# Patient Record
Sex: MALE | Race: WHITE | Employment: OTHER | URBAN - METROPOLITAN AREA
[De-identification: names, ages, dates, MRNs, and addresses within clinical notes are randomized per-mention and may not be internally consistent; named-entity substitution may affect disease eponyms.]

---

## 2019-06-21 ENCOUNTER — HOSPITAL ENCOUNTER (OUTPATIENT)
Dept: PREADMISSION TESTING | Age: 69
Discharge: HOME | End: 2019-06-21
Attending: ORTHOPAEDIC SURGERY
Payer: MEDICARE

## 2019-06-21 DIAGNOSIS — M54.6 THORACIC SPINE PAIN: ICD-10-CM

## 2019-06-21 DIAGNOSIS — Z01.812 BLOOD TESTS PRIOR TO TREATMENT OR PROCEDURE: ICD-10-CM

## 2019-06-21 DIAGNOSIS — Z01.818 OTHER SPECIFIED PRE-OPERATIVE EXAMINATION: ICD-10-CM

## 2019-06-21 LAB
ALBUMIN SERPL-MCNC: 4 G/DL (ref 3.4–5)
ALBUMIN/GLOB SERPL: 1.3 {RATIO} (ref 0.8–1.7)
ALP SERPL-CCNC: 108 U/L (ref 45–117)
ALT SERPL-CCNC: 32 U/L (ref 16–61)
ANION GAP SERPL CALC-SCNC: 6 MMOL/L (ref 3–18)
APPEARANCE UR: CLEAR
APTT PPP: 28.5 SEC (ref 23–36.4)
AST SERPL-CCNC: 23 U/L (ref 15–37)
ATRIAL RATE: 57 BPM
BACTERIA SPEC CULT: NORMAL
BASOPHILS # BLD: 0.1 K/UL (ref 0–0.1)
BASOPHILS NFR BLD: 1 % (ref 0–2)
BILIRUB SERPL-MCNC: 1.6 MG/DL (ref 0.2–1)
BILIRUB UR QL: NEGATIVE
BUN SERPL-MCNC: 14 MG/DL (ref 7–18)
BUN/CREAT SERPL: 12 (ref 12–20)
CALCIUM SERPL-MCNC: 9.4 MG/DL (ref 8.5–10.1)
CALCULATED P AXIS, ECG09: 56 DEGREES
CALCULATED R AXIS, ECG10: 24 DEGREES
CALCULATED T AXIS, ECG11: 71 DEGREES
CHLORIDE SERPL-SCNC: 103 MMOL/L (ref 100–108)
CO2 SERPL-SCNC: 31 MMOL/L (ref 21–32)
COLOR UR: YELLOW
CREAT SERPL-MCNC: 1.19 MG/DL (ref 0.6–1.3)
DIAGNOSIS, 93000: NORMAL
DIFFERENTIAL METHOD BLD: ABNORMAL
EOSINOPHIL # BLD: 0.1 K/UL (ref 0–0.4)
EOSINOPHIL NFR BLD: 2 % (ref 0–5)
ERYTHROCYTE [DISTWIDTH] IN BLOOD BY AUTOMATED COUNT: 12.4 % (ref 11.6–14.5)
ERYTHROCYTE [SEDIMENTATION RATE] IN BLOOD: 2 MM/HR (ref 0–20)
GLOBULIN SER CALC-MCNC: 3.2 G/DL (ref 2–4)
GLUCOSE SERPL-MCNC: 92 MG/DL (ref 74–99)
GLUCOSE UR STRIP.AUTO-MCNC: NEGATIVE MG/DL
HBA1C MFR BLD: 5.6 % (ref 4.2–5.6)
HCT VFR BLD AUTO: 48.2 % (ref 36–48)
HGB BLD-MCNC: 16.7 G/DL (ref 13–16)
HGB UR QL STRIP: NEGATIVE
INR PPP: 0.9 (ref 0.8–1.2)
KETONES UR QL STRIP.AUTO: NEGATIVE MG/DL
LEUKOCYTE ESTERASE UR QL STRIP.AUTO: NEGATIVE
LYMPHOCYTES # BLD: 1.7 K/UL (ref 0.9–3.6)
LYMPHOCYTES NFR BLD: 25 % (ref 21–52)
MCH RBC QN AUTO: 33.5 PG (ref 24–34)
MCHC RBC AUTO-ENTMCNC: 34.6 G/DL (ref 31–37)
MCV RBC AUTO: 96.6 FL (ref 74–97)
MONOCYTES # BLD: 0.5 K/UL (ref 0.05–1.2)
MONOCYTES NFR BLD: 7 % (ref 3–10)
NEUTS SEG # BLD: 4.4 K/UL (ref 1.8–8)
NEUTS SEG NFR BLD: 65 % (ref 40–73)
NITRITE UR QL STRIP.AUTO: NEGATIVE
P-R INTERVAL, ECG05: 188 MS
PH UR STRIP: 7.5 [PH] (ref 5–8)
PLATELET # BLD AUTO: 224 K/UL (ref 135–420)
PMV BLD AUTO: 10.1 FL (ref 9.2–11.8)
POTASSIUM SERPL-SCNC: 4.8 MMOL/L (ref 3.5–5.5)
PROT SERPL-MCNC: 7.2 G/DL (ref 6.4–8.2)
PROT UR STRIP-MCNC: NEGATIVE MG/DL
PROTHROMBIN TIME: 11.8 SEC (ref 11.5–15.2)
Q-T INTERVAL, ECG07: 394 MS
QRS DURATION, ECG06: 86 MS
QTC CALCULATION (BEZET), ECG08: 383 MS
RBC # BLD AUTO: 4.99 M/UL (ref 4.7–5.5)
SERVICE CMNT-IMP: NORMAL
SODIUM SERPL-SCNC: 140 MMOL/L (ref 136–145)
SP GR UR REFRACTOMETRY: 1.01 (ref 1–1.03)
UROBILINOGEN UR QL STRIP.AUTO: 0.2 EU/DL (ref 0.2–1)
VENTRICULAR RATE, ECG03: 57 BPM
WBC # BLD AUTO: 6.7 K/UL (ref 4.6–13.2)

## 2019-06-21 PROCEDURE — 36415 COLL VENOUS BLD VENIPUNCTURE: CPT

## 2019-06-21 PROCEDURE — 80053 COMPREHEN METABOLIC PANEL: CPT

## 2019-06-21 PROCEDURE — 83036 HEMOGLOBIN GLYCOSYLATED A1C: CPT

## 2019-06-21 PROCEDURE — 87641 MR-STAPH DNA AMP PROBE: CPT

## 2019-06-21 PROCEDURE — 81003 URINALYSIS AUTO W/O SCOPE: CPT

## 2019-06-21 PROCEDURE — 85610 PROTHROMBIN TIME: CPT

## 2019-06-21 PROCEDURE — 85652 RBC SED RATE AUTOMATED: CPT

## 2019-06-21 PROCEDURE — 85025 COMPLETE CBC W/AUTO DIFF WBC: CPT

## 2019-06-21 PROCEDURE — 93005 ELECTROCARDIOGRAM TRACING: CPT

## 2019-06-21 PROCEDURE — 85730 THROMBOPLASTIN TIME PARTIAL: CPT

## 2019-07-01 ENCOUNTER — ANESTHESIA EVENT (OUTPATIENT)
Dept: SURGERY | Age: 69
DRG: 460 | End: 2019-07-01
Payer: MEDICARE

## 2019-07-01 NOTE — H&P
History and Physical    Patient: Casey Saleh MRN: [id number]  SSN: [id number]    YOB: 1950  Age: 77 y.o. Sex: male      Subjective:      Casey Saleh is a 77 y.o. male who presents with evolving weakness and discoordination in his lower extremities. He has classic lower extremity myelopathic signs with absence of findings in the upper extremities. 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 Just being monitored at present    Arthritis     fingers, hips, back     Past Surgical History:   Procedure Laterality Date    HX OTHER SURGICAL      Colonoscopies      No family history on file. Social History     Tobacco Use    Smoking status: Never Smoker    Smokeless tobacco: Never Used   Substance Use Topics    Alcohol use: Yes     Alcohol/week: 0.6 oz     Types: 1 Cans of beer per week     Comment: ocassional- a few per week      Prior to Admission medications    Medication Sig Start Date End Date Taking? Authorizing Provider   acetaminophen (TYLENOL EXTRA STRENGTH) 500 mg tablet Take 500 mg by mouth every six (6) hours as needed for Pain. Provider, Historical   methocarbamol (ROBAXIN) 500 mg tablet Take 1,000 mg by mouth nightly. Provider, Historical   vit B Cmplx 3-FA-Vit C-Biotin (NEPHRO JOSE RX) 1- mg-mg-mcg tablet Take 1 Tab by mouth daily. Provider, Historical   ascorbic acid, vitamin C, (VITAMIN C) 500 mg tablet Take 500 mg by mouth daily. Provider, Historical   Omega-3 Fatty Acids 60- mg cpDR Take  by mouth daily. Provider, Historical   nicotinic acid (NIACIN) 500 mg tablet Take 500 mg by mouth Daily (before breakfast). Provider, Historical   cyanocobalamin 1,000 mcg tablet Take 1,000 mcg by mouth daily.     Provider, Historical        Allergies   Allergen Reactions    [place] Swelling     Of mouth and where ever the nut touches       Review of Systems:  A comprehensive review of systems was negative except for that written in the History of Present Illness. Objective: There were no vitals filed for this visit. Physical Exam:    The patient is neurologically intact bilaterally from C5-T1 and L2-S2 and its respective myotomes and dermatomes. There is april myelopathic features on exam - with lower extremity hyperreflexia, bilateral babinski's, clonus and ataxic gait. Skin is supple and amenable for surgery. Assessment:     Thoracic myelopathy    Plan:     In summary, this is a patient with clinical and radiographic evidence of severe thoracic spinal cord stenosis. The patient has significant quality of life and functional limitations. I've recommended that the surgical option of a open surgical decompression and instrumented fusion of T11-12. Ive explained the risks and benefits of the operation to the patient at length, including but not limited to surgical site infection (both superficial and deep, potentially requiring reoperation and prolonged antibiotic therapy), neurological injury and dural tears with resultant CSF leakage (short term and long term). Rare medical complications of anesthesia such as MI, VTE, stroke, pneumonia, post-operative delirium, UTI, post-operative blindness and even death. The patient may sufficiently lose enough blood to warrant and blood transfusion. Long-term complications such as stiffness, adjacent joint arthrosis, non-union, hardware failure, recurrent symptoms and failure to resolve symptoms completely. Ive also explained to the patient the post-operative rehabilitation protocol and restrictions to the patient. The patient has understood these risks and has provided me with informed consent FOR T11-12 DECOMPRESSION AND INSTRUMENTED FUSION.        Signed By: Sonja Ren MD     July 1, 2019

## 2019-07-02 ENCOUNTER — HOSPITAL ENCOUNTER (INPATIENT)
Age: 69
LOS: 1 days | Discharge: HOME HEALTH | DRG: 460 | End: 2019-07-03
Attending: ORTHOPAEDIC SURGERY | Admitting: ORTHOPAEDIC SURGERY
Payer: MEDICARE

## 2019-07-02 ENCOUNTER — ANESTHESIA (OUTPATIENT)
Dept: SURGERY | Age: 69
DRG: 460 | End: 2019-07-02
Payer: MEDICARE

## 2019-07-02 ENCOUNTER — APPOINTMENT (OUTPATIENT)
Dept: GENERAL RADIOLOGY | Age: 69
DRG: 460 | End: 2019-07-02
Attending: ORTHOPAEDIC SURGERY
Payer: MEDICARE

## 2019-07-02 DIAGNOSIS — M47.14 THORACIC MYELOPATHY: Primary | ICD-10-CM

## 2019-07-02 LAB
ABO + RH BLD: NORMAL
BLOOD GROUP ANTIBODIES SERPL: NORMAL
SPECIMEN EXP DATE BLD: NORMAL

## 2019-07-02 PROCEDURE — 86900 BLOOD TYPING SEROLOGIC ABO: CPT

## 2019-07-02 PROCEDURE — 76210000017 HC OR PH I REC 1.5 TO 2 HR: Performed by: ORTHOPAEDIC SURGERY

## 2019-07-02 PROCEDURE — 77030020262 HC SOL INJ SOD CL 0.9% 100ML: Performed by: ORTHOPAEDIC SURGERY

## 2019-07-02 PROCEDURE — 77030013079 HC BLNKT BAIR HGGR 3M -A: Performed by: ANESTHESIOLOGY

## 2019-07-02 PROCEDURE — C1713 ANCHOR/SCREW BN/BN,TIS/BN: HCPCS | Performed by: ORTHOPAEDIC SURGERY

## 2019-07-02 PROCEDURE — 77030032490 HC SLV COMPR SCD KNE COVD -B: Performed by: ORTHOPAEDIC SURGERY

## 2019-07-02 PROCEDURE — 77030006643: Performed by: ANESTHESIOLOGY

## 2019-07-02 PROCEDURE — 97161 PT EVAL LOW COMPLEX 20 MIN: CPT

## 2019-07-02 PROCEDURE — 74011250636 HC RX REV CODE- 250/636: Performed by: ORTHOPAEDIC SURGERY

## 2019-07-02 PROCEDURE — 77030011256 HC DRSG MEPILEX <16IN NO BORD MOLN -A: Performed by: ORTHOPAEDIC SURGERY

## 2019-07-02 PROCEDURE — 74011250636 HC RX REV CODE- 250/636

## 2019-07-02 PROCEDURE — 77030020268 HC MISC GENERAL SUPPLY: Performed by: ORTHOPAEDIC SURGERY

## 2019-07-02 PROCEDURE — 77030037875 HC DRSG MEPILEX <16IN BORD MOLN -A

## 2019-07-02 PROCEDURE — 74011250637 HC RX REV CODE- 250/637: Performed by: ANESTHESIOLOGY

## 2019-07-02 PROCEDURE — 76060000039 HC ANESTHESIA 4 TO 4.5 HR: Performed by: ORTHOPAEDIC SURGERY

## 2019-07-02 PROCEDURE — 77030008477 HC STYL SATN SLP COVD -A: Performed by: ANESTHESIOLOGY

## 2019-07-02 PROCEDURE — 77030039267 HC ADH SKN EXOFIN S2SG -B: Performed by: ORTHOPAEDIC SURGERY

## 2019-07-02 PROCEDURE — 77030031139 HC SUT VCRL2 J&J -A: Performed by: ORTHOPAEDIC SURGERY

## 2019-07-02 PROCEDURE — 77030018836 HC SOL IRR NACL ICUM -A: Performed by: ORTHOPAEDIC SURGERY

## 2019-07-02 PROCEDURE — 65270000029 HC RM PRIVATE

## 2019-07-02 PROCEDURE — 74011000250 HC RX REV CODE- 250: Performed by: ORTHOPAEDIC SURGERY

## 2019-07-02 PROCEDURE — 77030003028 HC SUT VCRL J&J -A: Performed by: ORTHOPAEDIC SURGERY

## 2019-07-02 PROCEDURE — 36415 COLL VENOUS BLD VENIPUNCTURE: CPT

## 2019-07-02 PROCEDURE — 74011000258 HC RX REV CODE- 258: Performed by: ORTHOPAEDIC SURGERY

## 2019-07-02 PROCEDURE — 74011250637 HC RX REV CODE- 250/637: Performed by: ORTHOPAEDIC SURGERY

## 2019-07-02 PROCEDURE — 77030037875 HC DRSG MEPILEX <16IN BORD MOLN -A: Performed by: ORTHOPAEDIC SURGERY

## 2019-07-02 PROCEDURE — 76010000135 HC OR TIME 4 TO 4.5 HR: Performed by: ORTHOPAEDIC SURGERY

## 2019-07-02 PROCEDURE — C9290 INJ, BUPIVACAINE LIPOSOME: HCPCS | Performed by: ORTHOPAEDIC SURGERY

## 2019-07-02 PROCEDURE — 77030008683 HC TU ET CUF COVD -A: Performed by: ANESTHESIOLOGY

## 2019-07-02 PROCEDURE — 77030002916 HC SUT ETHLN J&J -A: Performed by: ORTHOPAEDIC SURGERY

## 2019-07-02 PROCEDURE — 77030002933 HC SUT MCRYL J&J -A: Performed by: ORTHOPAEDIC SURGERY

## 2019-07-02 PROCEDURE — 77030020782 HC GWN BAIR PAWS FLX 3M -B: Performed by: ORTHOPAEDIC SURGERY

## 2019-07-02 PROCEDURE — 74011250636 HC RX REV CODE- 250/636: Performed by: ANESTHESIOLOGY

## 2019-07-02 PROCEDURE — 77030004402 HC BUR NEUR STRY -C: Performed by: ORTHOPAEDIC SURGERY

## 2019-07-02 PROCEDURE — 77030008462 HC STPLR SKN PROX J&J -A: Performed by: ORTHOPAEDIC SURGERY

## 2019-07-02 PROCEDURE — 77030031140 HC SUT VCRL3 J&J -A: Performed by: ORTHOPAEDIC SURGERY

## 2019-07-02 PROCEDURE — 72100 X-RAY EXAM L-S SPINE 2/3 VWS: CPT

## 2019-07-02 PROCEDURE — 77030020010 HC FCPS BPLR DISP INLC -E: Performed by: ORTHOPAEDIC SURGERY

## 2019-07-02 PROCEDURE — 74011000250 HC RX REV CODE- 250

## 2019-07-02 PROCEDURE — 77030029397 HC SHTH SBS BPLR SEALR MEDT -F: Performed by: ORTHOPAEDIC SURGERY

## 2019-07-02 PROCEDURE — 0RG6071 FUSION OF THORACIC VERTEBRAL JOINT WITH AUTOLOGOUS TISSUE SUBSTITUTE, POSTERIOR APPROACH, POSTERIOR COLUMN, OPEN APPROACH: ICD-10-PCS | Performed by: ORTHOPAEDIC SURGERY

## 2019-07-02 DEVICE — SCREW 54840005545 MAS 5.5X45 CC
Type: IMPLANTABLE DEVICE | Site: SPINE THORACIC | Status: FUNCTIONAL
Brand: CD HORIZON® SPINAL SYSTEM

## 2019-07-02 DEVICE — DBM 006005 PROGENIX PLUS 5CC
Type: IMPLANTABLE DEVICE | Site: SPINE THORACIC | Status: FUNCTIONAL
Brand: PROGENIX® PUTTY AND PROGENIX® PLUS

## 2019-07-02 RX ORDER — CYCLOBENZAPRINE HCL 10 MG
5 TABLET ORAL 3 TIMES DAILY
Status: DISCONTINUED | OUTPATIENT
Start: 2019-07-02 | End: 2019-07-03 | Stop reason: HOSPADM

## 2019-07-02 RX ORDER — SODIUM CHLORIDE, SODIUM LACTATE, POTASSIUM CHLORIDE, CALCIUM CHLORIDE 600; 310; 30; 20 MG/100ML; MG/100ML; MG/100ML; MG/100ML
125 INJECTION, SOLUTION INTRAVENOUS CONTINUOUS
Status: DISCONTINUED | OUTPATIENT
Start: 2019-07-02 | End: 2019-07-02

## 2019-07-02 RX ORDER — ACETAMINOPHEN 500 MG
1000 TABLET ORAL ONCE
Status: COMPLETED | OUTPATIENT
Start: 2019-07-02 | End: 2019-07-02

## 2019-07-02 RX ORDER — PROPOFOL 10 MG/ML
INJECTION, EMULSION INTRAVENOUS
Status: DISCONTINUED | OUTPATIENT
Start: 2019-07-02 | End: 2019-07-02 | Stop reason: HOSPADM

## 2019-07-02 RX ORDER — FENTANYL CITRATE 50 UG/ML
INJECTION, SOLUTION INTRAMUSCULAR; INTRAVENOUS AS NEEDED
Status: DISCONTINUED | OUTPATIENT
Start: 2019-07-02 | End: 2019-07-02 | Stop reason: HOSPADM

## 2019-07-02 RX ORDER — FLUMAZENIL 0.1 MG/ML
0.2 INJECTION INTRAVENOUS
Status: DISCONTINUED | OUTPATIENT
Start: 2019-07-02 | End: 2019-07-02 | Stop reason: HOSPADM

## 2019-07-02 RX ORDER — KETAMINE HYDROCHLORIDE 10 MG/ML
INJECTION, SOLUTION INTRAMUSCULAR; INTRAVENOUS AS NEEDED
Status: DISCONTINUED | OUTPATIENT
Start: 2019-07-02 | End: 2019-07-02 | Stop reason: HOSPADM

## 2019-07-02 RX ORDER — PROPOFOL 10 MG/ML
INJECTION, EMULSION INTRAVENOUS AS NEEDED
Status: DISCONTINUED | OUTPATIENT
Start: 2019-07-02 | End: 2019-07-02 | Stop reason: HOSPADM

## 2019-07-02 RX ORDER — HYDROMORPHONE HYDROCHLORIDE 2 MG/ML
0.5 INJECTION, SOLUTION INTRAMUSCULAR; INTRAVENOUS; SUBCUTANEOUS
Status: DISCONTINUED | OUTPATIENT
Start: 2019-07-02 | End: 2019-07-02 | Stop reason: HOSPADM

## 2019-07-02 RX ORDER — SENNOSIDES 8.6 MG/1
1 TABLET ORAL DAILY
Status: DISCONTINUED | OUTPATIENT
Start: 2019-07-02 | End: 2019-07-03 | Stop reason: HOSPADM

## 2019-07-02 RX ORDER — HYDROMORPHONE HYDROCHLORIDE 2 MG/ML
INJECTION, SOLUTION INTRAMUSCULAR; INTRAVENOUS; SUBCUTANEOUS AS NEEDED
Status: DISCONTINUED | OUTPATIENT
Start: 2019-07-02 | End: 2019-07-02 | Stop reason: HOSPADM

## 2019-07-02 RX ORDER — SODIUM CHLORIDE, SODIUM LACTATE, POTASSIUM CHLORIDE, CALCIUM CHLORIDE 600; 310; 30; 20 MG/100ML; MG/100ML; MG/100ML; MG/100ML
125 INJECTION, SOLUTION INTRAVENOUS CONTINUOUS
Status: DISCONTINUED | OUTPATIENT
Start: 2019-07-02 | End: 2019-07-02 | Stop reason: HOSPADM

## 2019-07-02 RX ORDER — LIDOCAINE HYDROCHLORIDE 20 MG/ML
INJECTION, SOLUTION EPIDURAL; INFILTRATION; INTRACAUDAL; PERINEURAL AS NEEDED
Status: DISCONTINUED | OUTPATIENT
Start: 2019-07-02 | End: 2019-07-02 | Stop reason: HOSPADM

## 2019-07-02 RX ORDER — ONDANSETRON 2 MG/ML
INJECTION INTRAMUSCULAR; INTRAVENOUS AS NEEDED
Status: DISCONTINUED | OUTPATIENT
Start: 2019-07-02 | End: 2019-07-02 | Stop reason: HOSPADM

## 2019-07-02 RX ORDER — SODIUM CHLORIDE 0.9 % (FLUSH) 0.9 %
5-40 SYRINGE (ML) INJECTION AS NEEDED
Status: DISCONTINUED | OUTPATIENT
Start: 2019-07-02 | End: 2019-07-03 | Stop reason: HOSPADM

## 2019-07-02 RX ORDER — OXYCODONE AND ACETAMINOPHEN 5; 325 MG/1; MG/1
1 TABLET ORAL
Status: DISCONTINUED | OUTPATIENT
Start: 2019-07-02 | End: 2019-07-03 | Stop reason: HOSPADM

## 2019-07-02 RX ORDER — EPHEDRINE SULFATE/0.9% NACL/PF 25 MG/5 ML
SYRINGE (ML) INTRAVENOUS AS NEEDED
Status: DISCONTINUED | OUTPATIENT
Start: 2019-07-02 | End: 2019-07-02 | Stop reason: HOSPADM

## 2019-07-02 RX ORDER — PREGABALIN 50 MG/1
50 CAPSULE ORAL ONCE
Status: COMPLETED | OUTPATIENT
Start: 2019-07-02 | End: 2019-07-02

## 2019-07-02 RX ORDER — SODIUM CHLORIDE 0.9 % (FLUSH) 0.9 %
5-40 SYRINGE (ML) INJECTION AS NEEDED
Status: DISCONTINUED | OUTPATIENT
Start: 2019-07-02 | End: 2019-07-02 | Stop reason: HOSPADM

## 2019-07-02 RX ORDER — CEFAZOLIN SODIUM/WATER 2 G/20 ML
2 SYRINGE (ML) INTRAVENOUS ONCE
Status: COMPLETED | OUTPATIENT
Start: 2019-07-02 | End: 2019-07-02

## 2019-07-02 RX ORDER — HYDROMORPHONE HYDROCHLORIDE 1 MG/ML
0.5 INJECTION, SOLUTION INTRAMUSCULAR; INTRAVENOUS; SUBCUTANEOUS
Status: DISCONTINUED | OUTPATIENT
Start: 2019-07-02 | End: 2019-07-03 | Stop reason: HOSPADM

## 2019-07-02 RX ORDER — ENOXAPARIN SODIUM 100 MG/ML
40 INJECTION SUBCUTANEOUS EVERY 24 HOURS
Status: DISCONTINUED | OUTPATIENT
Start: 2019-07-03 | End: 2019-07-03 | Stop reason: HOSPADM

## 2019-07-02 RX ORDER — DOCUSATE SODIUM 100 MG/1
100 CAPSULE, LIQUID FILLED ORAL 2 TIMES DAILY
Status: DISCONTINUED | OUTPATIENT
Start: 2019-07-02 | End: 2019-07-03 | Stop reason: HOSPADM

## 2019-07-02 RX ORDER — NALOXONE HYDROCHLORIDE 0.4 MG/ML
0.4 INJECTION, SOLUTION INTRAMUSCULAR; INTRAVENOUS; SUBCUTANEOUS AS NEEDED
Status: DISCONTINUED | OUTPATIENT
Start: 2019-07-02 | End: 2019-07-02 | Stop reason: HOSPADM

## 2019-07-02 RX ORDER — MIDAZOLAM HYDROCHLORIDE 1 MG/ML
INJECTION, SOLUTION INTRAMUSCULAR; INTRAVENOUS AS NEEDED
Status: DISCONTINUED | OUTPATIENT
Start: 2019-07-02 | End: 2019-07-02 | Stop reason: HOSPADM

## 2019-07-02 RX ORDER — FENTANYL CITRATE 50 UG/ML
50 INJECTION, SOLUTION INTRAMUSCULAR; INTRAVENOUS AS NEEDED
Status: DISCONTINUED | OUTPATIENT
Start: 2019-07-02 | End: 2019-07-02 | Stop reason: HOSPADM

## 2019-07-02 RX ORDER — HYDROCODONE BITARTRATE AND ACETAMINOPHEN 5; 325 MG/1; MG/1
1 TABLET ORAL AS NEEDED
Status: DISCONTINUED | OUTPATIENT
Start: 2019-07-02 | End: 2019-07-02 | Stop reason: HOSPADM

## 2019-07-02 RX ORDER — ROCURONIUM BROMIDE 10 MG/ML
INJECTION, SOLUTION INTRAVENOUS AS NEEDED
Status: DISCONTINUED | OUTPATIENT
Start: 2019-07-02 | End: 2019-07-02 | Stop reason: HOSPADM

## 2019-07-02 RX ORDER — SODIUM CHLORIDE 0.9 % (FLUSH) 0.9 %
5-40 SYRINGE (ML) INJECTION EVERY 8 HOURS
Status: DISCONTINUED | OUTPATIENT
Start: 2019-07-02 | End: 2019-07-03 | Stop reason: HOSPADM

## 2019-07-02 RX ORDER — DEXAMETHASONE SODIUM PHOSPHATE 4 MG/ML
INJECTION, SOLUTION INTRA-ARTICULAR; INTRALESIONAL; INTRAMUSCULAR; INTRAVENOUS; SOFT TISSUE AS NEEDED
Status: DISCONTINUED | OUTPATIENT
Start: 2019-07-02 | End: 2019-07-02 | Stop reason: HOSPADM

## 2019-07-02 RX ORDER — CEFAZOLIN SODIUM/WATER 2 G/20 ML
2 SYRINGE (ML) INTRAVENOUS EVERY 8 HOURS
Status: COMPLETED | OUTPATIENT
Start: 2019-07-02 | End: 2019-07-03

## 2019-07-02 RX ORDER — VANCOMYCIN HYDROCHLORIDE 1 G/20ML
INJECTION, POWDER, LYOPHILIZED, FOR SOLUTION INTRAVENOUS AS NEEDED
Status: DISCONTINUED | OUTPATIENT
Start: 2019-07-02 | End: 2019-07-02 | Stop reason: HOSPADM

## 2019-07-02 RX ORDER — CELECOXIB 100 MG/1
200 CAPSULE ORAL 2 TIMES DAILY
Status: DISCONTINUED | OUTPATIENT
Start: 2019-07-02 | End: 2019-07-03 | Stop reason: HOSPADM

## 2019-07-02 RX ORDER — GABAPENTIN 100 MG/1
100 CAPSULE ORAL 3 TIMES DAILY
Status: DISCONTINUED | OUTPATIENT
Start: 2019-07-02 | End: 2019-07-03 | Stop reason: HOSPADM

## 2019-07-02 RX ORDER — NEOSTIGMINE METHYLSULFATE 5 MG/5 ML
SYRINGE (ML) INTRAVENOUS AS NEEDED
Status: DISCONTINUED | OUTPATIENT
Start: 2019-07-02 | End: 2019-07-02 | Stop reason: HOSPADM

## 2019-07-02 RX ORDER — GLYCOPYRROLATE 0.2 MG/ML
INJECTION INTRAMUSCULAR; INTRAVENOUS AS NEEDED
Status: DISCONTINUED | OUTPATIENT
Start: 2019-07-02 | End: 2019-07-02 | Stop reason: HOSPADM

## 2019-07-02 RX ORDER — SODIUM CHLORIDE 0.9 % (FLUSH) 0.9 %
5-40 SYRINGE (ML) INJECTION EVERY 8 HOURS
Status: DISCONTINUED | OUTPATIENT
Start: 2019-07-02 | End: 2019-07-02 | Stop reason: HOSPADM

## 2019-07-02 RX ORDER — ACETAMINOPHEN 325 MG/1
650 TABLET ORAL
Status: DISCONTINUED | OUTPATIENT
Start: 2019-07-02 | End: 2019-07-03 | Stop reason: HOSPADM

## 2019-07-02 RX ADMIN — Medication 5 MG: at 09:55

## 2019-07-02 RX ADMIN — KETAMINE HYDROCHLORIDE 10 MG: 10 INJECTION, SOLUTION INTRAMUSCULAR; INTRAVENOUS at 09:02

## 2019-07-02 RX ADMIN — CYCLOBENZAPRINE HYDROCHLORIDE 5 MG: 10 TABLET, FILM COATED ORAL at 15:51

## 2019-07-02 RX ADMIN — LIDOCAINE HYDROCHLORIDE 60 MG: 20 INJECTION, SOLUTION EPIDURAL; INFILTRATION; INTRACAUDAL; PERINEURAL at 07:42

## 2019-07-02 RX ADMIN — HYDROMORPHONE HYDROCHLORIDE 0.5 MG: 2 INJECTION, SOLUTION INTRAMUSCULAR; INTRAVENOUS; SUBCUTANEOUS at 10:20

## 2019-07-02 RX ADMIN — KETAMINE HYDROCHLORIDE 10 MG: 10 INJECTION, SOLUTION INTRAMUSCULAR; INTRAVENOUS at 08:12

## 2019-07-02 RX ADMIN — Medication 2 G: at 08:00

## 2019-07-02 RX ADMIN — HYDROMORPHONE HYDROCHLORIDE 0.5 MG: 2 INJECTION, SOLUTION INTRAMUSCULAR; INTRAVENOUS; SUBCUTANEOUS at 08:16

## 2019-07-02 RX ADMIN — ACETAMINOPHEN 650 MG: 325 TABLET ORAL at 18:11

## 2019-07-02 RX ADMIN — LIDOCAINE HYDROCHLORIDE 20 MG: 20 INJECTION, SOLUTION EPIDURAL; INFILTRATION; INTRACAUDAL; PERINEURAL at 09:15

## 2019-07-02 RX ADMIN — Medication 5 MG: at 09:33

## 2019-07-02 RX ADMIN — PREGABALIN 50 MG: 50 CAPSULE ORAL at 06:10

## 2019-07-02 RX ADMIN — PROPOFOL 20 MG: 10 INJECTION, EMULSION INTRAVENOUS at 09:43

## 2019-07-02 RX ADMIN — PROPOFOL 30 MG: 10 INJECTION, EMULSION INTRAVENOUS at 09:16

## 2019-07-02 RX ADMIN — PROPOFOL 140 MG: 10 INJECTION, EMULSION INTRAVENOUS at 07:42

## 2019-07-02 RX ADMIN — ENOXAPARIN SODIUM 40 MG: 40 INJECTION SUBCUTANEOUS at 23:17

## 2019-07-02 RX ADMIN — Medication 10 MG: at 08:47

## 2019-07-02 RX ADMIN — KETAMINE HYDROCHLORIDE 10 MG: 10 INJECTION, SOLUTION INTRAMUSCULAR; INTRAVENOUS at 07:39

## 2019-07-02 RX ADMIN — GLYCOPYRROLATE 0.2 MG: 0.2 INJECTION INTRAMUSCULAR; INTRAVENOUS at 11:24

## 2019-07-02 RX ADMIN — DEXAMETHASONE SODIUM PHOSPHATE 10 MG: 4 INJECTION, SOLUTION INTRA-ARTICULAR; INTRALESIONAL; INTRAMUSCULAR; INTRAVENOUS; SOFT TISSUE at 07:51

## 2019-07-02 RX ADMIN — DOCUSATE SODIUM 100 MG: 100 CAPSULE, LIQUID FILLED ORAL at 21:22

## 2019-07-02 RX ADMIN — HYDROMORPHONE HYDROCHLORIDE 0.5 MG: 2 INJECTION, SOLUTION INTRAMUSCULAR; INTRAVENOUS; SUBCUTANEOUS at 08:26

## 2019-07-02 RX ADMIN — OXYCODONE HYDROCHLORIDE AND ACETAMINOPHEN 1 TABLET: 5; 325 TABLET ORAL at 19:40

## 2019-07-02 RX ADMIN — LIDOCAINE HYDROCHLORIDE 40 MG: 20 INJECTION, SOLUTION EPIDURAL; INFILTRATION; INTRACAUDAL; PERINEURAL at 08:00

## 2019-07-02 RX ADMIN — SODIUM CHLORIDE, SODIUM LACTATE, POTASSIUM CHLORIDE, AND CALCIUM CHLORIDE: 600; 310; 30; 20 INJECTION, SOLUTION INTRAVENOUS at 08:27

## 2019-07-02 RX ADMIN — CYCLOBENZAPRINE HYDROCHLORIDE 5 MG: 10 TABLET, FILM COATED ORAL at 21:22

## 2019-07-02 RX ADMIN — Medication 5 MG: at 10:13

## 2019-07-02 RX ADMIN — ROCURONIUM BROMIDE 35 MG: 10 INJECTION, SOLUTION INTRAVENOUS at 07:43

## 2019-07-02 RX ADMIN — ACETAMINOPHEN 1000 MG: 500 TABLET ORAL at 06:10

## 2019-07-02 RX ADMIN — GABAPENTIN 100 MG: 100 CAPSULE ORAL at 15:50

## 2019-07-02 RX ADMIN — KETAMINE HYDROCHLORIDE 10 MG: 10 INJECTION, SOLUTION INTRAMUSCULAR; INTRAVENOUS at 07:54

## 2019-07-02 RX ADMIN — SODIUM CHLORIDE, SODIUM LACTATE, POTASSIUM CHLORIDE, AND CALCIUM CHLORIDE 125 ML/HR: 600; 310; 30; 20 INJECTION, SOLUTION INTRAVENOUS at 06:04

## 2019-07-02 RX ADMIN — Medication 5 MG: at 09:28

## 2019-07-02 RX ADMIN — Medication 10 MG: at 10:31

## 2019-07-02 RX ADMIN — MIDAZOLAM HYDROCHLORIDE 2 MG: 1 INJECTION, SOLUTION INTRAMUSCULAR; INTRAVENOUS at 07:37

## 2019-07-02 RX ADMIN — DOCUSATE SODIUM 100 MG: 100 CAPSULE, LIQUID FILLED ORAL at 15:51

## 2019-07-02 RX ADMIN — GABAPENTIN 100 MG: 100 CAPSULE ORAL at 21:23

## 2019-07-02 RX ADMIN — LIDOCAINE HYDROCHLORIDE 40 MG: 20 INJECTION, SOLUTION EPIDURAL; INFILTRATION; INTRACAUDAL; PERINEURAL at 08:30

## 2019-07-02 RX ADMIN — SODIUM CHLORIDE, SODIUM LACTATE, POTASSIUM CHLORIDE, AND CALCIUM CHLORIDE 125 ML: 600; 310; 30; 20 INJECTION, SOLUTION INTRAVENOUS at 12:12

## 2019-07-02 RX ADMIN — PROPOFOL 20 MG: 10 INJECTION, EMULSION INTRAVENOUS at 10:24

## 2019-07-02 RX ADMIN — Medication 2 G: at 15:50

## 2019-07-02 RX ADMIN — HYDROMORPHONE HYDROCHLORIDE 0.5 MG: 2 INJECTION, SOLUTION INTRAMUSCULAR; INTRAVENOUS; SUBCUTANEOUS at 09:38

## 2019-07-02 RX ADMIN — CELECOXIB 200 MG: 100 CAPSULE ORAL at 21:22

## 2019-07-02 RX ADMIN — FENTANYL CITRATE 100 MCG: 50 INJECTION, SOLUTION INTRAMUSCULAR; INTRAVENOUS at 07:36

## 2019-07-02 RX ADMIN — Medication 2 MG: at 11:24

## 2019-07-02 RX ADMIN — PROPOFOL 20 MG: 10 INJECTION, EMULSION INTRAVENOUS at 09:33

## 2019-07-02 RX ADMIN — SODIUM CHLORIDE, SODIUM LACTATE, POTASSIUM CHLORIDE, AND CALCIUM CHLORIDE: 600; 310; 30; 20 INJECTION, SOLUTION INTRAVENOUS at 07:40

## 2019-07-02 RX ADMIN — ONDANSETRON 4 MG: 2 INJECTION INTRAMUSCULAR; INTRAVENOUS at 09:33

## 2019-07-02 RX ADMIN — Medication 2 G: at 23:17

## 2019-07-02 RX ADMIN — SENNOSIDES 8.6 MG: 8.6 TABLET, FILM COATED ORAL at 15:50

## 2019-07-02 RX ADMIN — KETAMINE HYDROCHLORIDE 10 MG: 10 INJECTION, SOLUTION INTRAMUSCULAR; INTRAVENOUS at 08:39

## 2019-07-02 RX ADMIN — Medication 5 MG: at 07:45

## 2019-07-02 RX ADMIN — PROPOFOL 50 MCG/KG/MIN: 10 INJECTION, EMULSION INTRAVENOUS at 08:13

## 2019-07-02 RX ADMIN — ONDANSETRON 4 MG: 2 INJECTION INTRAMUSCULAR; INTRAVENOUS at 11:18

## 2019-07-02 NOTE — PERIOP NOTES
Verbal hand off at the bedside with Dong Mack provided opportunity for questions. Dual skin assessment and family members were updated.

## 2019-07-02 NOTE — ROUTINE PROCESS
[facility] TRANSFER - IN REPORT: 
 
Verbal report received from Rosa M Dasilva RN(name) on [facility] being received from [employer]) for routine post - op Report consisted of patients Situation, Background, Assessment and  
Recommendations(SBAR). Information from the following report(s) SBAR, Kardex, Intake/Output, MAR and Recent Results was reviewed with the receiving nurse. Opportunity for questions and clarification was provided. Assessment completed upon patients arrival to unit and care assumed.

## 2019-07-02 NOTE — BRIEF OP NOTE
BRIEF OPERATIVE NOTE    Date of Procedure: 7/2/2019   Preoperative Diagnosis: DISEASE OF SPINAL CORD, UNSPECIFIED  Postoperative Diagnosis: DISEASE OF SPINAL CORD, UNSPECIFIED    Procedure(s):  SPINE THORACIC LEVEL 11 THROUGH THORACIC LEVEL 12 DECOMPRESSION AND FUSION WITH NEUROMONITORING WITH C-ARM, \"SPEC POP\"  Surgeon(s) and Role:     Trudy Kerns MD - Primary         Surgical Assistant: Neil Corbett    Surgical Staff:  Circ-1: Angelia Au RN  Circ-Relief: Davi Cisneros RN  Radiology Technician: Harman Arellano  Scrub Tech-1: Oliva Huddleston  Surg Asst-1: Neil Corbett    Event Time In Time Out   Incision Start 4515    Incision Close       END 1133    Anesthesia: General   Estimated Blood Loss: 200cc  Specimens: * No specimens in log *   Findings: Stenosis S78-98   Complications: NIL    Implants:   Implant Name Type Inv.  Item Serial No.  Lot No. LRB No. Used Action   SCR SPNE MULTAXL MAS 5.5X45MM -- SOLERA - [id number]  SCR SPNE MULTAXL MAS 5.5X45MM -- SOLERA  MEDTRONIC SOFAMOR DANEK 0 N/A 2 Implanted   SCR SPNE MULTAXL MAS 5.5X40MM -- SOLERA - [id number]  SCR SPNE MULTAXL MAS 5.5X40MM -- SOLERA  MEDTRONIC SOFAMOR DANEK 0 N/A 2 Implanted   GRAFT BNE PROGENIX PLUS 5ML --  - [id number]  GRAFT BNE PROGENIX PLUS 5ML --   [facility] TECH [id number] N/A 1 Implanted   SCR SET SOLARA 30 4.75MM TI --  - [id number]  SCR SET SOLARA 30 4.75MM TI --   MEDTRONIC SOFAMOR DANEK 0 N/A 4 Implanted   RADHA SPNE CRV COCR 4.56X70LD -- SOLERA - [id number]  RADHA SPNE CRV COCR [id number] -- SOLERA  MEDTRONIC SOFAMOR DANEK 0 N/A 2 Implanted

## 2019-07-02 NOTE — ANESTHESIA PREPROCEDURE EVALUATION
Relevant Problems   No relevant active problems       Anesthetic History        Pertinent negatives: No PONV       Review of Systems / Medical History  Patient summary reviewed, nursing notes reviewed and pertinent labs reviewed    Pulmonary              Pertinent negatives: No asthma     Neuro/Psych           Pertinent negatives: No seizures  Comments: Pt with myelopathy involving bilateral lower extremities, worse on left side and left foot. Cardiovascular                Pertinent negatives: No hypertension, past MI and angina  Exercise tolerance: >4 METS     GI/Hepatic/Renal             Pertinent negatives: No GERD, liver disease and renal disease   Endo/Other        Arthritis  Pertinent negatives: No diabetes and obesity   Other Findings              Physical Exam    Airway  Mallampati: II  TM Distance: 4 - 6 cm  Neck ROM: normal range of motion   Mouth opening: Normal     Cardiovascular    Rhythm: regular  Rate: normal         Dental  No notable dental hx       Pulmonary  Breath sounds clear to auscultation               Abdominal  GI exam deferred       Other Findings            Anesthetic Plan    ASA: 1  Anesthesia type: general            Anesthetic plan and risks discussed with: Patient      Plan GETA. Pt understands risks up including but not limited to MI, CVA and upto and including death under anesthesia. Pt informed of rare and unpredictable risk of POVL that can occur in prolonged back surgery in the prone position. Pt accepts all risks and agrees to proceed.

## 2019-07-02 NOTE — PROGRESS NOTES
Problem: Mobility Impaired (Adult and Pediatric)  Goal: *Acute Goals and Plan of Care (Insert Text)  Description  Physical Therapy Goals   Initiated 7/2/2019 and to be accomplished within 3-5 day(s)  1. Patient will move from supine <> sit with S in prep for out of bed activity and change of position. 2.  Patient will perform sit<> stand with S with LRAD in prep for transfers/ambulation. 3.  Patient will transfer from bed <> chair with S with LRAD for time up in chair for completion of ADL activity. 4.  Patient will ambulate 150 feet with LRAD/S for improved functional mobility/safe discharge. 5.  Patient will ascend/descend 3-5 stairs with handrail with minimal assistance/contact guard assist for home re-entry as needed. Outcome: Progressing Towards Goal   PHYSICAL THERAPY EVALUATION    Patient: Catracho Francisco (5 y.o. male)  Date: 7/2/2019  Primary Diagnosis: Thoracic myelopathy [M47.14]  Procedure(s) (LRB):  SPINE THORACIC LEVEL 11 THROUGH THORACIC LEVEL 12 DECOMPRESSION AND FUSION WITH NEUROMONITORING WITH C-ARM, \"SPEC POP\" (N/A) Day of Surgery   Precautions:Back, Fall, Spinal    ASSESSMENT :  Based on the objective data described below, the patient presents with decrease independence w/ bed mobility, transfers, gait, and step negotiation. Pt seen in supine prior to session w/ supplemental O2, IV, BP, pulse ox, and B/L SCDs donned. Pt reported 1/10 pain at this time in lower back. Pt has no c/o lightheadedness, dizziness or nausea. Pt's vitals assessed WNLs. Pt educated on all spinal precautions and how to properly log roll for supine<>sit. Pt able to ambulate a total of 100 ft w/ RW/GB and demonstrates a step to, antalgic gait, decrease carolina, decrease stride length, and decrease step clearance. Pt transferred back to room where pt was left in supine after session, call bell and tray in reach, B/L SCDs donned, vitals assessed WNLs, nurse notified after session.       Patient will benefit from skilled intervention to address the above impairments. Patient?s rehabilitation potential is considered to be Good  Factors which may influence rehabilitation potential include:   ? None noted  ? Mental ability/status  ? Medical condition  ? Home/family situation and support systems  ? Safety awareness  ? Pain tolerance/management  ? Other:      PLAN :  Recommendations and Planned Interventions:  ?           Bed Mobility Training             ? Neuromuscular Re-Education  ? Transfer Training                   ? Orthotic/Prosthetic Training  ? Gait Training                          ? Modalities  ? Therapeutic Exercises          ? Edema Management/Control  ? Therapeutic Activities            ? Patient and Family Training/Education  ? Other (comment):    Frequency/Duration: Patient will be followed by physical therapy twice daily to address goals. Discharge Recommendations: Home Health  Further Equipment Recommendations for Discharge: rolling walker     SUBJECTIVE:   Patient stated ? It's fun to walk.?    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  Just being monitored at present    Arthritis     fingers, hips, back     Past Surgical History:   Procedure Laterality Date    HX OTHER SURGICAL      Colonoscopies     Barriers to Learning/Limitations: yes;  physical  Compensate with: Verbal Cues and Tactile Cues  Prior Level of Function/Home Situation:   Home Situation  Home Environment: Private residence  # Steps to Enter: 4  Rails to Enter: Yes  Hand Rails : Right  One/Two Story Residence: Two story  # of Interior Steps: 15  Interior Rails: Right  Lift Chair Available: No  Living Alone: No  Support Systems: Spouse/Significant Other/Partner  Patient Expects to be Discharged to[de-identified] Private residence  Current DME Used/Available at Home: None  Critical Behavior:  Neurologic State: Alert  Orientation Level: Oriented X4  Cognition: Appropriate decision making  Psychosocial  Patient Behaviors: Calm; Cooperative  Family  Behaviors: Calm; Cooperative;Supportive  Purposeful Interaction: Yes  Pt Identified Daily Priority: Clinical issues (comment)  Caritas Process: Nurture loving kindness  Caring Interventions: Reassure  Reassure: Therapeutic listening  Skin Condition/Temp: Warm  Family  Behaviors: Calm; Cooperative;Supportive  Skin Integrity: Incision (comment)  Skin Integumentary  Skin Color: Appropriate for ethnicity  Skin Condition/Temp: Warm  Skin Integrity: Incision (comment)  Turgor: Non-tenting  Hair Growth: Present  Varicosities: Absent  Strength:    Strength: Generally decreased, functional  Tone & Sensation:   Sensation: Intact  Range Of Motion:  AROM: Generally decreased, functional  Functional Mobility:  Bed Mobility:  Rolling: Stand-by assistance  Supine to Sit: Stand-by assistance  Sit to Supine: Stand-by assistance  Scooting: Supervision  Transfers:  Sit to Stand: Contact guard assistance  Stand to Sit: Contact guard assistance  Balance:   Sitting: Intact  Standing: Intact; With support  Ambulation/Gait Training:  Distance (ft): 100 Feet (ft)  Assistive Device: Gait belt;Walker, rolling  Ambulation - Level of Assistance: Contact guard assistance  Gait Description (WDL): Exceptions to WDL  Gait Abnormalities: Decreased step clearance  Speed/Deedee: Slow  Step Length: Left shortened;Right shortened  Pain:  Pain Scale 1: Numeric (0 - 10)  Pain Intensity 1: 4  Pain Location 1: Back  Pain Orientation 1: Lower  Pain Description 1: Aching  Pain Intervention(s) 1: Declines; Rest  Activity Tolerance:   Fair+  Please refer to the flowsheet for vital signs taken during this treatment. After treatment:   ?         Patient left in no apparent distress sitting up in chair  ? Patient left in no apparent distress in bed  ?          Call bell left within reach  ? Nursing notified  ? Caregiver present(spouse)  ? Bed alarm activated    COMMUNICATION/EDUCATION:   ?         Fall prevention education was provided and the patient/caregiver indicated understanding. ? Patient/family have participated as able in goal setting and plan of care. ?         Patient/family agree to work toward stated goals and plan of care. ?         Patient understands intent and goals of therapy, but is neutral about his/her participation. ? Patient is unable to participate in goal setting and plan of care.     Thank you for this referral.  Hector Donald, PT   Time Calculation: 12 mins   Eval Complexity: History: MEDIUM  Complexity : 1-2 comorbidities / personal factors will impact the outcome/ POC Exam:LOW Complexity : 1-2 Standardized tests and measures addressing body structure, function, activity limitation and / or participation in recreation  Presentation: LOW Complexity : Stable, uncomplicated  Clinical Decision Making:Low Complexity ambulate >30ft  Overall Complexity:LOW

## 2019-07-02 NOTE — OP NOTES
FULL OPERATIVE NOTE    Date of Procedure: 7/2/2019   Preoperative Diagnosis: DISEASE OF SPINAL CORD, UNSPECIFIED  Postoperative Diagnosis: DISEASE OF SPINAL CORD, UNSPECIFIED    Procedure(s):  SPINE THORACIC LEVEL 11 THROUGH THORACIC LEVEL 12 DECOMPRESSION AND FUSION WITH NEUROMONITORING WITH C-ARM, \"SPEC POP\"    1. T11-12 posterior decompression with laminectomy  2. T11-12 posterior instrumentation, pedicle screws, bilateral  3. T11-12 arthrodesis, posterolateral  4. Neuromonitoring  5. Application of local autograft    Surgeon(s) and Role:     Amanda Bolton MD - Primary         Surgical Assistant: Sangita Cralwey    Surgical Staff:  Circ-1: Naomi Traore RN  Circ-Relief: Dene Hammans, RN  Radiology Technician: Pete Patricia  Scrub Tech-1: Reva Vallecillo  Surg Asst-1: Sangita Crawley    Event Time In Time Out   Incision Start 8918    Incision Close       END 1140    Anesthesia: General   Estimated Blood Loss: 200cc  Specimens: * No specimens in log *   Findings: Stenosis V24-61   Complications: NIL    Implants:   Implant Name Type Inv. Item Serial No.  Lot No. LRB No. Used Action   SCR SPNE MULTAXL MAS 5.5X45MM -- SOLERA - [id number]  SCR SPNE MULTAXL MAS 5.5X45MM -- SOLERA  MEDTRONIC SOFAMOR DANEK 0 N/A 2 Implanted   SCR SPNE MULTAXL MAS 5.5X40MM -- SOLERA - [id number]  SCR SPNE MULTAXL MAS 5.5X40MM -- SOLERA  MEDTRONIC SOFAMOR DANEK 0 N/A 2 Implanted   GRAFT BNE PROGENIX PLUS 5ML --  - [id number]  GRAFT BNE PROGENIX PLUS 5ML --   [facility] [id number] N/A 1 Implanted   SCR SET SOLARA 30 4.75MM TI --  - [id number]  SCR SET SOLARA 30 4.75MM TI --   MEDTRONIC SOFAMOR DANEK 0 N/A 4 Implanted   RADHA SPNE CRV COCR [id number] -- SOLERA - [id number]  RADHA SPNE CRV COCR [id number] -- SOLERA  MEDTRONIC SOFAMOR DANEK 0 N/A 2 Implanted     Procedure Details: The patient was brought to the operating room.   A surgical pause was performed to ensure the correct patient, correct operation, availability of implants, and the correct levels. The patient received the benefits of general anesthesia, was then intubated. Ness catheter was inserted. Neuromonitoring leads were applied. The patient was then positioned prone on radiolucent Braxton table with bolsters to the chest and pelvis. The eyes, the abdomen as well as the genitalia were free. All bony prominences were well padded. The posterior aspect of the thoracic spine was then prepped and draped in standard fashion. Sterile field was achieved. Final time-out was performed to ensure the correct patient, correct operation as well as availability of implants. The patient received 2 g of intravenous Ancef preoperatively as well as 1 g of tranexamic acid. A longitudinal skin incision was made over the midline lumbar spine. Skin and subcutaneous tissues were carefully divided. Marginal exposure with spinous processes was performed and the levels were localized to fluoroscopic guidance. Subperiosteal dissection and lateral expansion of the deep dissection were carried out. Meticulous hemostasis was achieved. We then began the insertion of the pedicle screws in a standard trajectory. This was done with burring the start point and then using a combination of the awl and the pedicle finder under Fluoroscopic guidance to cannulate our pedicle screw trajectories. Appropriate length and diameter pedicle screw was then inserted bilaterally at T11 and T12, there was excellent purchase. The screw position was then confirmed radiographically. Neuromonitoring was stable and at baseline and all screws were quiet to electrical stimulation at 20mAmps. We then began the decompression process. This was performed sequentially with rongeur as well as drills as well as #3, #2 Kerrison side to remove the dorsal aspect of the central T11 and 12 lamina.   This was then expanded laterally to the medial margins of the lateral recesses. The junction between the dura as well as ligamentum flavum was defined using a Woodsen elevator. We used a combination of the 15-Blade as well as #2 Kerrison to remove the entirety of the dorsal ligamentum flavum, which was significantly hypertrophied. This was then extended laterally. There was evidence of significant stenosis especially over the right side at the T11-12 interval. The decompression was performed incrementally until dorsal and lateral recesses were effectively decompressed. The right sided decompression was performed with partial removal of the right sided T11-12 facet. There was significant adherence of the facet cyst and ligamentum flavum on the dural sheath, which was left undisturbed. After this was performed, we interrogated the entire extent of decompression with a nerve hook and no remaining stenosis was present. Intraoperative ultrasound was used and the spinal cord was adequately decompressed. Neuromonitoring remained stable and baseline at this time. We then burred down the facets and the facets and transverse processes and to ensure maximal canc ellous bleeding bone. We applied local bone graft and DBM into the posterolateral gutters. We then inserted appropriate lengthed 5.5 mm luis and secured it to the screws with endcaps and finally tightened to [address] [address]. Final x-rays were taken to ensure the appropriate length and position of the hardware. The wound was then copiously irrigated. It was closed in layered fashion with medium Hemovac drain inserted in situ. The fascia was closed with #1 Vicryl. A generous paraspinal block was performed using Exparel. Suprafascial vancomycin powder was used. The subcutaneous layer was closed with 2-0 Vicryl as well as 3-0 Monocryl to the dermal layer. Dermabond/Primeo was applied to the skin. Sterile dressing was applied.       Postoperatively, the patient was then flipped back to supine position. The patient was then extubated. He was moving both lower extremities postoperatively. The patient was transferred to PACU in stable condition. Postoperatively, the patient will require 3 doses of antibiotics. He will have his drain removed in the morning of postoperative day 1. He will need physical therapy and occupational therapy to help with mobilization.       ______________________________  SIGNED:  Nichelle Yancey MD

## 2019-07-02 NOTE — PROGRESS NOTES
[facility] care of patient at this time, assessment complete. Patient alert and oriented x4. Denies SOB and chest pain. Patient lungs clear bilaterally. Cap refilled  less than 3 seconds. Patient denies numbness and tingling to all extremities. Stated pain 3/10. Patient has 18G IV to left hand. Dressing to back, mepilex . TEDs and SCDs applied to BLE. Patient encouraged to use IS. Patient verbalized understanding. Ice pack in place, call light and personal items in reach, bed in low position and locked, will continue to monitor patient. Fall risk arm band in place. 7174  Paged Dr. David Phillips to discuss getting order for stool softener for patient, for constipation, pt has not had BM since 6/30/19 and concerned about symptoms getting worse with take narcotic pain medication. Awaiting call back. 1620  Patient ambulated hallway with physical therapist.     [phone]  Patient resting in bed, spouse at bedside. 1811  PRN medication Tylenol given for pain at this time. 1940 PRN pain medication Percocet given at this time. 1941  Patient had uneventful shift, pain controlled by PRN pain medication Tylenol. No signs or symptoms of distress. Patient ambulating and voiding small amounts. Plan for patient to d/c home tomorrow 7/3/19.

## 2019-07-02 NOTE — PROGRESS NOTES
[address] [place] [address] and spoke to Dr. Ashly Heredia about patients antibiotic orders, no orders were in system. Telephone order with read back for 2 grams Ancef every 8 hours for a total of 3 doses post operatively.

## 2019-07-02 NOTE — ROUTINE PROCESS
1950 
Bedside in verbal shift change report given to Andres Gaines (oncoming nurse) by uSzie Tong RN (off going nurse). Report included the following information: SBAR, KARDEX, MAR and recent results.

## 2019-07-02 NOTE — INTERVAL H&P NOTE
H&P Update: 
Mendoza Frias was seen and examined. History and physical has been reviewed. The patient has been examined.  There have been no significant clinical changes since the completion of the originally dated History and Physical.

## 2019-07-02 NOTE — PERIOP NOTES
TRANSFER - OUT REPORT:    Verbal report given to Chilo Martins (name) on Carlos Castaneda  being transferred to [facility](unit) for routine post - op       Report consisted of patients Situation, Background, Assessment and   Recommendations(SBAR). Information from the following report(s) SBAR, Kardex, Procedure Summary, Intake/Output, MAR, Recent Results and Med Rec Status was reviewed with the receiving nurse. Lines:   Peripheral IV 07/02/19 Left;Posterior Hand (Active)   Site Assessment Clean, dry, & intact 7/2/2019 12:37 PM   Phlebitis Assessment 0 7/2/2019 12:37 PM   Infiltration Assessment 0 7/2/2019 12:37 PM   Dressing Status Clean, dry, & intact 7/2/2019 12:37 PM   Dressing Type Transparent 7/2/2019 12:37 PM   Hub Color/Line Status Infusing 7/2/2019 12:37 PM        Opportunity for questions and clarification was provided.       Patient transported with:   O2 @ 2 liters       Intake/Output Summary (Last 24 hours) at 7/2/2019 1342  Last data filed at 7/2/2019 1237  Gross per 24 hour   Intake 1950 ml   Output 250 ml   Net 1700 ml

## 2019-07-02 NOTE — ANESTHESIA POSTPROCEDURE EVALUATION
Post-Anesthesia Evaluation and Assessment    Cardiovascular Function/Vital Signs  Visit Vitals  /63   Pulse 79   Temp 36.4 °C (97.5 °F)   Resp 12   Ht 6' 1\" (1.854 m)   Wt 77.7 kg (171 lb 4 oz)   SpO2 100%   BMI 22.59 kg/m²       Patient is status post Procedure(s):  SPINE THORACIC LEVEL 11 THROUGH THORACIC LEVEL 12 DECOMPRESSION AND FUSION WITH NEUROMONITORING WITH C-ARM, \"SPEC POP\". Nausea/Vomiting: Controlled. Postoperative hydration reviewed and adequate. Pain:  Pain Scale 1: Numeric (0 - 10) (07/02/19 1350)  Pain Intensity 1: 0 (07/02/19 1350)   Managed. Neurological Status:   Neuro (WDL): Exceptions to WDL (07/02/19 9954)   At baseline. Mental Status and Level of Consciousness: Baseline and stable. Pulmonary Status:   O2 Device: Oxygen mask (07/02/19 1208)   Adequate oxygenation and airway patent. Complications related to anesthesia: None    Post-anesthesia assessment completed. No concerns. Patient has met all discharge requirements.     Signed By: Claudene Limes, MD

## 2019-07-03 VITALS
SYSTOLIC BLOOD PRESSURE: 100 MMHG | TEMPERATURE: 97.9 F | OXYGEN SATURATION: 98 % | RESPIRATION RATE: 16 BRPM | HEART RATE: 60 BPM | DIASTOLIC BLOOD PRESSURE: 59 MMHG | BODY MASS INDEX: 23.68 KG/M2 | WEIGHT: 178.7 LBS | HEIGHT: 73 IN

## 2019-07-03 LAB
ERYTHROCYTE [DISTWIDTH] IN BLOOD BY AUTOMATED COUNT: 12.3 % (ref 11.6–14.5)
HCT VFR BLD AUTO: 37.9 % (ref 36–48)
HGB BLD-MCNC: 12.9 G/DL (ref 13–16)
MCH RBC QN AUTO: 33.1 PG (ref 24–34)
MCHC RBC AUTO-ENTMCNC: 34 G/DL (ref 31–37)
MCV RBC AUTO: 97.2 FL (ref 74–97)
PLATELET # BLD AUTO: 184 K/UL (ref 135–420)
PMV BLD AUTO: 9.8 FL (ref 9.2–11.8)
RBC # BLD AUTO: 3.9 M/UL (ref 4.7–5.5)
WBC # BLD AUTO: 12.3 K/UL (ref 4.6–13.2)

## 2019-07-03 PROCEDURE — 97530 THERAPEUTIC ACTIVITIES: CPT

## 2019-07-03 PROCEDURE — 74011250636 HC RX REV CODE- 250/636: Performed by: ORTHOPAEDIC SURGERY

## 2019-07-03 PROCEDURE — 51798 US URINE CAPACITY MEASURE: CPT

## 2019-07-03 PROCEDURE — 97166 OT EVAL MOD COMPLEX 45 MIN: CPT

## 2019-07-03 PROCEDURE — 36415 COLL VENOUS BLD VENIPUNCTURE: CPT

## 2019-07-03 PROCEDURE — 74011250637 HC RX REV CODE- 250/637: Performed by: ORTHOPAEDIC SURGERY

## 2019-07-03 PROCEDURE — 97535 SELF CARE MNGMENT TRAINING: CPT

## 2019-07-03 PROCEDURE — 97116 GAIT TRAINING THERAPY: CPT

## 2019-07-03 PROCEDURE — 77030037875 HC DRSG MEPILEX <16IN BORD MOLN -A

## 2019-07-03 PROCEDURE — 85027 COMPLETE CBC AUTOMATED: CPT

## 2019-07-03 RX ORDER — GABAPENTIN 100 MG/1
100 CAPSULE ORAL 3 TIMES DAILY
Qty: 60 CAP | Refills: 0 | Status: SHIPPED | OUTPATIENT
Start: 2019-07-03

## 2019-07-03 RX ORDER — MELOXICAM 7.5 MG/1
15 TABLET ORAL DAILY
Qty: 30 TAB | Refills: 0 | Status: SHIPPED | OUTPATIENT
Start: 2019-07-03

## 2019-07-03 RX ORDER — MELOXICAM 15 MG/1
15 TABLET ORAL DAILY
Qty: 30 TAB | Refills: 0 | Status: SHIPPED | OUTPATIENT
Start: 2019-07-03

## 2019-07-03 RX ORDER — OXYCODONE AND ACETAMINOPHEN 5; 325 MG/1; MG/1
1 TABLET ORAL
Qty: 48 TAB | Refills: 0 | Status: SHIPPED | OUTPATIENT
Start: 2019-07-03 | End: 2019-07-06

## 2019-07-03 RX ADMIN — DOCUSATE SODIUM 100 MG: 100 CAPSULE, LIQUID FILLED ORAL at 09:07

## 2019-07-03 RX ADMIN — CYCLOBENZAPRINE HYDROCHLORIDE 5 MG: 10 TABLET, FILM COATED ORAL at 09:07

## 2019-07-03 RX ADMIN — GABAPENTIN 100 MG: 100 CAPSULE ORAL at 15:51

## 2019-07-03 RX ADMIN — SENNOSIDES 8.6 MG: 8.6 TABLET, FILM COATED ORAL at 09:08

## 2019-07-03 RX ADMIN — CYCLOBENZAPRINE HYDROCHLORIDE 5 MG: 10 TABLET, FILM COATED ORAL at 15:51

## 2019-07-03 RX ADMIN — CELECOXIB 200 MG: 100 CAPSULE ORAL at 09:08

## 2019-07-03 RX ADMIN — OXYCODONE HYDROCHLORIDE AND ACETAMINOPHEN 1 TABLET: 5; 325 TABLET ORAL at 15:51

## 2019-07-03 RX ADMIN — GABAPENTIN 100 MG: 100 CAPSULE ORAL at 09:08

## 2019-07-03 RX ADMIN — Medication 2 G: at 09:07

## 2019-07-03 RX ADMIN — OXYCODONE HYDROCHLORIDE AND ACETAMINOPHEN 1 TABLET: 5; 325 TABLET ORAL at 10:58

## 2019-07-03 RX ADMIN — Medication 10 ML: at 06:38

## 2019-07-03 NOTE — PROGRESS NOTES
Problem: Self Care Deficits Care Plan (Adult)  Goal: *Acute Goals and Plan of Care (Insert Text)  Description  Initial Occupational Therapy Goals (7/3/2019) Within 7 day(s):    1. Patient will perform toilet transfer with Supervision in preparation for bowel and bladder management. 2. Patient will perform bowel and bladder management with setup for increased independence with ADLs. 3. Patient will perform UB dressing with setup for increased independence with ADLs. 4. Patient will perform LB dressing with setup/Hoda & A/E PRN for increased independence with ADLs. 5. Patient will adhere to back/spinal precautions 100% of the time with 1-2 verbal cues for increased independence with ADLs. 6. Patient will utilize energy conservation techniques with 1 verbal cue(s) for increased independence with ADLs. Outcome: Resolved/Met     OCCUPATIONAL THERAPY EVALUATION/DISCHARGE    Patient: Maxwell Ashraf (44 y.o. male)  Date: 7/3/2019  Primary Diagnosis: Thoracic myelopathy [M47.14]  Procedure(s) (LRB):  SPINE THORACIC LEVEL 11 THROUGH THORACIC LEVEL 12 DECOMPRESSION AND FUSION WITH NEUROMONITORING WITH C-ARM, \"SPEC POP\" (N/A) 1 Day Post-Op   Precautions:  Back, Fall  PLOF: Patient reports independence, but R \"leg not working\". Pt is a yogi. Pt recently moved into 2 story home from 1 story. ASSESSMENT AND RECOMMENDATIONS:  Based on the objective data described below, the patient presents with BLE decreased ROM and strength affecting LE ADLs. Patient able to utilize BLE ankle-over-knee technique sock donning. Patient has to use BUE's to assist RLE over L knee for LE dressing. Instructed to dress RLE first and able to complete w/o assist except for compression hose. Instructed on compensatory strategy. Patient reports spouse will be available to assist. Pt w/ multiple questions regarding yoga and encouraged pt to ask MD about specific Yoga poses that patient that are safe to do s/p surgery.     Education: Reviewed Back Precautions, LSO/Brace management, body mechanics, home safety, adaptive strategies and adaptive dressing techniques including clothing modifications with patient verbalizing understanding at this time. Skilled Occupational Therapy is not indicated at this time in this setting. Patient should continue to improve as pain and ROM/strength improves. Discharge Recommendations: Home Health  Further Equipment Recommendations for Discharge: N/A      SUBJECTIVE:   Patient stated ? I don't think I really need any home health. ?    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 Just being monitored at present    Arthritis     fingers, hips, back     Past Surgical History:   Procedure Laterality Date    HX OTHER SURGICAL      Colonoscopies     Barriers to Learning/Limitations: yes;  cognitive and physical  Compensate with: visual, verbal, tactile, kinesthetic cues/model    Home Situation/Prior Level of Function:   Home Situation  Home Environment: Private residence  # Steps to Enter: 4  Rails to Enter: Yes  Hand Rails : Right  One/Two Story Residence: Two story  # of Interior Steps: 14  Interior Rails: Right  Lift Chair Available: No  Living Alone: No  Support Systems: Spouse/Significant Other/Partner  Patient Expects to be Discharged to[de-identified] Private residence  Current DME Used/Available at Home: None  Tub or Shower Type: Tub/Shower combination(2nd floor; 1/2 bath on first)  ? Right hand dominant   ? Left hand dominant    Cognitive/Behavioral Status:  Neurologic State: Alert; Appropriate for age  Orientation Level: Oriented X4  Cognition: Appropriate decision making; Appropriate for age attention/concentration; Appropriate safety awareness; Follows commands  Safety/Judgement: Awareness of environment    Skin: back incision w/ dressing/Mepilex & HemeVAC   Edema: compression hose in place      Vision/Perceptual:   Appears WFL w/ glasses    Coordination: BUE  Coordination: Within functional limits  Fine Motor Skills-Upper: Left Intact; Right Intact    Gross Motor Skills-Upper: Left Intact; Right Intact    Balance:  Sitting: Intact  Standing: Intact; With support    Strength: BUE  Strength: Generally decreased, functional    Tone & Sensation:BUE  Tone: Normal  Sensation: Intact    Range of Motion: BUE  AROM: Generally decreased, functional  PROM: Generally decreased, functional    Functional Mobility and Transfers for ADLs:  Bed Mobility:  Rolling: Supervision  Supine to Sit: Supervision  Sit to Supine: Supervision  Scooting: Supervision  Transfers:  Sit to Stand: Supervision  Bed to Chair: Supervision   Toilet Transfer : Supervision   Bathroom Mobility: Supervision/set up    ADL Assessment:  Feeding: Setup    Oral Facial Hygiene/Grooming: Supervision    Bathing: Setup    Upper Body Dressing: Setup    Lower Body Dressing: Setup    Toileting: Supervision    ADL Intervention:  Feeding  Feeding Assistance: Set-up  Container Management: Set-up  Cutting Food: Set-up  Utensil Management: Set-up  Food to Mouth: Set-up  Drink to Mouth: Set-up    Grooming  Washing Hands: Set-up    Upper Body Dressing Assistance  Pullover Shirt: Set-up    Lower Body Dressing Assistance  Underpants: Set-up  Pants With Elastic Waist: Set-up  Socks: Set-up  Antiembolitic Stockings: Contact guard assistance;Minimum assistance; Compensatory technique training  Shoes with Cloth Laces: Set-up  Position Performed: Seated edge of bed    LE Adaptive Equipment:  ? Adaptive Equipment was not issued due pt able to complete with out use of AE and use of AE would prevent stretching needed to progress with recovery. (Pt will require assist with Compression hose). Toileting  Toileting Assistance: Supervision  Bladder Hygiene: Supervision  Clothing Management: Supervision    Cognitive Retraining  Problem Solving: Inductive reason; Identifying the task; Identifying the problem;General alternative solution;Deductive reason; Awareness of environment  Executive Functions: Executing cognitive plans;Managing time;Regulating behavior  Organizing/Sequencing: Breaking task down;Prioritizing  Safety/Judgement: Awareness of environment  Cues: Tactile cues provided;Verbal cues provided;Visual cues provided    Pain:  Pain level pre-treatment: 2/10  Pain level post-treatment: 2/10  Pain Intervention(s): Medication administer by Nursing (see MAR); Rest, Ice, Repositioning   Response to intervention: Nurse notified, see doc flow sheet    Activity Tolerance:   Fair. Patient able to stand 1-2 minute(s). Patient able to complete ADLs with intermittent rest breaks. Patient limited by cognition (pain-Rx related?), strength, ROM. Please refer to the flowsheet for vital signs taken during this treatment. After treatment:   ?  Patient left in no apparent distress sitting up in chair  ? Patient sitting on EOB  ? Patient left in no apparent distress in bed  ? Call bell left within reach  ? Nursing notified/Indu  ? Caregiver present  ? Ice applied  ? SCD's on while back in bed  ? Bed alarm activated    COMMUNICATION/EDUCATION:   Communication/Collaboration:  ?       Role of Occupational Therapy in the acute care setting. ? Home safety education was provided and the patient/caregiver indicated understanding. ? Patient/family have participated as able in goal setting and plan of care. ?      Patient/family agree to work toward stated goals and plan of care. ?      Patient understands intent and goals of therapy, but is neutral about his/her participation. ? Patient is unable to participate in plan of care at this time. Thank you for this referral.  Nina Zamora, OTR/L  Time Calculation: 34 mins    Eval Complexity: History: HIGH Complexity : Extensive review of history including physical, cognitive and psychosocial history ;    Examination: MEDIUM Complexity : 3-5 performance deficits relating to physical, cognitive , or psychosocial skils that result in activity limitations and / or participation restrictions; Decision Making:MEDIUM Complexity : Patient may present with comorbidities that affect occupational performnce.  Miniml to moderate modification of tasks or assistance (eg, physical or verbal ) with assesment(s) is necessary to enable patient to complete evaluation

## 2019-07-03 NOTE — PROGRESS NOTES
Dual AVS reviewed with MURRAY Petit RN. All medications reviewed individually with patient. Opportunities for questions and concerns provided. Patient discharged via [facility] to Main entrance Patient's arm band appropriately discarded.

## 2019-07-03 NOTE — DISCHARGE SUMMARY
Discharge Summary     Patient: Maxwell Ashraf MRN: [id number]  SSN: [id number]    YOB: 1950  Age: 77 y.o. Sex: male       Admit Date: 7/2/2019    Discharge Date: 7/3/2019      Admission Diagnoses: Thoracic myelopathy [M47.14]    Discharge Diagnoses:     Problem List as of 7/3/2019 Date Reviewed: 7/3/2019          Codes Class Noted - Resolved    Thoracic myelopathy ICD-10-CM: M47.14  ICD-9-CM: 721.41  7/2/2019 - Present             Discharge Condition: Good    Hospital Course: Uncomplicated. T11-12 posterior decompression and fusion. Uncomplicated technical or post-operative course. Consults: OT and PT. Significant Diagnostic Studies:  NIL    Disposition: home with home health    Discharge Medications:     Current Discharge Medication List      START taking these medications    Details   gabapentin (NEURONTIN) 100 mg capsule Take 1 Cap by mouth three (3) times daily. Max Daily Amount: 300 mg. Indications: acute pain following an operation  Qty: 60 Cap, Refills: 0    Associated Diagnoses: Thoracic myelopathy      oxyCODONE-acetaminophen (PERCOCET) 5-325 mg per tablet Take 1 Tab by mouth every four (4) hours as needed for Pain for up to 3 days. Max Daily Amount: 6 Tabs. Qty: 48 Tab, Refills: 0    Associated Diagnoses: Thoracic myelopathy         CONTINUE these medications which have NOT CHANGED    Details   vit B Cmplx 3-FA-Vit C-Biotin (NEPHRO JOSE RX) 1- mg-mg-mcg tablet Take 1 Tab by mouth daily. ascorbic acid, vitamin C, (VITAMIN C) 500 mg tablet Take 500 mg by mouth daily. Omega-3 Fatty Acids 60- mg cpDR Take  by mouth daily. nicotinic acid (NIACIN) 500 mg tablet Take 500 mg by mouth Daily (before breakfast). cyanocobalamin 1,000 mcg tablet Take 1,000 mcg by mouth daily. acetaminophen (TYLENOL EXTRA STRENGTH) 500 mg tablet Take 500 mg by mouth every six (6) hours as needed for Pain.       methocarbamol (ROBAXIN) 500 mg tablet Take 1,000 mg by mouth nightly. Activity: Activity as tolerated  Diet: Regular Diet  Wound Care: Keep wound clean and dry for 14 days. Change dressing with home health q2days. No orders of the defined types were placed in this encounter.       Signed By: Sheng Hinkle MD     July 3, 2019

## 2019-07-03 NOTE — PROGRESS NOTES
Transition of Care ([facility]) Plan:     Chart reviewed, met with pt in room. Pt planning discharge home, has cleared PT. FOC offered, pt chose At [address] 2112 for follow up; referral placed with CMS. At [facility] [address] to follow up on ordering RW through [employer] for delivery today to pt room. Pt and wife aware, and are also aware that if they don't wish to wait for walker they can purchase one at [facility], [facility], [facility], etc.    [facility]- met with pt after being informed by [facility] that pt RW will not be delivered today. Pt and wife had just seen MD who is aware pt planning to use \"walking sticks\". No other needs. [facility] Transportation:   How is patient being transported at discharge? Family/Friend      When? Once cleared by Therapy between 12-2pm     Is transport scheduled? N/A      Follow-up appointment and transportation:   PCP/Specialist?  See AVS for Appointment         Who is transporting to the follow-up appointment? Family/Friend      Is transport for follow up appointment scheduled? N/A    Communication plan (with patient/family): Who is being called? Patient or Next of Kin? Responsible party? Patient      What number(s) is to be used? See Facesheet      What service provider is calling for [facility] services? When are they calling? 24-48 hours following discharge    Readmission Risk? (Green/Low; Yellow/Moderate; Red/High):  Green    Care Management Interventions  PCP Verified by CM:  Yes  Transition of Care Consult (CM Consult): [address]: No  Reason Outside [place]: Physician referred to specific agency([facility] [address])  Discharge Durable Medical Equipment: Yes  Physical Therapy Consult: Yes  Occupational Therapy Consult: Yes  Current Support Network: Lives with Spouse, Own Home  Confirm Follow Up Transport: Family  Plan discussed with Pt/Family/Caregiver: Yes  Freedom of Choice Offered: Yes  Discharge Location  Discharge Placement: Home with home health

## 2019-07-03 NOTE — PROGRESS NOTES
1955 - Bedside report received from [facility]. Patient in bed. Pain 5/10. Just medicated for pain by day RN    2126 - Patient in bed at this time. IV to [address]  intact and patent. Sequential compression device bilaterally. TEDs to BLE. Dressing to back CDI. + CMS. Reports some slight numbness to Attila toes. Pt A & O x 4. LS clear, on RA. Abdomen soft, NT and ND. + BS to all 4 quadrants. Denies nausea. Pain 3/10. Call light within reach. Drain patent with sero sang drge. Medications given. Potential side effects explained to patient, patient verbalizes understanding, opportunities for questions provided. Patient stable, No apparent distress at this time, bed in locked position, call bell and phone within reach. Pt had uneventful shift. Pt ambulated without assistance, says he was told he does not need  a walker. Pt steady on his feet. Pain remained well-controlled with medication. No issues/concerns at this time.  Call bell within reach

## 2019-07-03 NOTE — PROGRESS NOTES
Problem: Mobility Impaired (Adult and Pediatric)  Goal: *Acute Goals and Plan of Care (Insert Text)  Description  Physical Therapy Goals   Initiated 7/2/2019 and to be accomplished within 3-5 day(s)  1. Patient will move from supine <> sit with S in prep for out of bed activity and change of position. 2.  Patient will perform sit<> stand with S with LRAD in prep for transfers/ambulation. 3.  Patient will transfer from bed <> chair with S with LRAD for time up in chair for completion of ADL activity. 4.  Patient will ambulate 150 feet with LRAD/S for improved functional mobility/safe discharge. 5.  Patient will ascend/descend 3-5 stairs with handrail with minimal assistance/contact guard assist for home re-entry as needed. Outcome: Resolved/Met  Note:   PHYSICAL THERAPY TREATMENT/DISCHARGE    Patient: Ana Akbar (27 y.o. male)  Date: 7/3/2019  Diagnosis: Thoracic myelopathy [M47.14] <principal problem not specified>  Procedure(s) (LRB):  SPINE THORACIC LEVEL 11 THROUGH THORACIC LEVEL 12 DECOMPRESSION AND FUSION WITH NEUROMONITORING WITH C-ARM, \"SPEC POP\" (N/A) 1 Day Post-Op  Precautions: Back, Fall  Chart, physical therapy assessment, plan of care and goals were reviewed. ASSESSMENT:  Pt rating pain in back 6/10 on numerical pain scale during mobility. Pt able to participate in transfer training, gt training and stair training meeting goals for this level of PT. Pt is ready to transition to HHPT. Pt able to negotiate flight of steps using alt step through pattern. Pt left supine in bed w/ all needs within reach. Nurse Ronny Villalba and Bernard Schneider 1 aware. Recommend HHPT/RW upon hospital d/c. Progression toward goals:  ?      Goals met  ? Improving appropriately and progressing toward goals  ? Improving slowly and progressing toward goals  ?       Not making progress toward goals and plan of care will be adjusted     PLAN:  Patient will be discharged from physical therapy at this time.  Rationale for discharge:  ? Goals Achieved  ? Plateau Reached  ? Patient not participating in therapy  ? Other:  Discharge Recommendations:  Home Health  Further Equipment Recommendations for Discharge:  rolling walker     SUBJECTIVE:   Patient stated ? I just got back into bed but I guess I can show you how I get up.?    OBJECTIVE DATA SUMMARY:   Critical Behavior:  Neurologic State: Alert, Appropriate for age  Orientation Level: Oriented X4  Cognition: Appropriate decision making, Appropriate for age attention/concentration, Appropriate safety awareness, Follows commands  Safety/Judgement: Awareness of environment  Functional Mobility Training:  Bed Mobility:  Rolling: Supervision  Supine to Sit: Supervision  Sit to Supine: Supervision  Scooting: Supervision  Transfers:  Sit to Stand: Supervision  Stand to Sit: Supervision  Balance:  Sitting: Intact  Standing: Intact; With support  Ambulation/Gait Training:  Distance (ft): 160 Feet (ft)  Assistive Device: Walker, rolling  Ambulation - Level of Assistance: Supervision  Gait Abnormalities: Antalgic;Decreased step clearance  Speed/Deedee: Pace decreased (<100 feet/min)  Step Length: Left shortened;Right shortened  Interventions: Safety awareness training;Verbal cues; Visual/Demos  Stairs:  Number of Stairs Trained: 11  Stairs - Level of Assistance: Supervision   Rail Use: Left   Neuro Re-Education:  Therapeutic Exercises:   Pain:  Pain Scale 1: Numeric (0 - 10)  Pain Intensity 1: 6  Pain Location 1: Back  Pain Orientation 1: Lower; Posterior  Activity Tolerance:   Good   Please refer to the flowsheet for vital signs taken during this treatment. After treatment:   ? Patient left in no apparent distress sitting up in chair  ? Patient left in no apparent distress in bed  ? Call bell left within reach  ? Nursing notified  ? Caregiver present  ?  Bed alarm activated  Memo Nance PT   Time Calculation: 23 mins

## 2019-07-03 NOTE — PROGRESS NOTES
Progress Note     Patient: Mendoza Frias MRN: [id number]  SSN: [id number]    YOB: 1950  Age: 77 y.o. Sex: male      POD:    1 Day Post-Op  S/P:    Procedure(s):  SPINE THORACIC LEVEL 11 THROUGH THORACIC LEVEL 12 DECOMPRESSION AND FUSION WITH NEUROMONITORING WITH C-ARM, \"SPEC POP\"     Subjective:   Pt is without complaints of pain. Patient states that his legs/feet are still \"numb\" like before surgery but that with PT yesterday he was able to walk more normally. He does not have any concerns this morning. Objective:     Patient Vitals for the past 12 hrs:   BP Temp Pulse Resp SpO2 Weight   07/03/19 0353 103/61        07/03/19 0349 104/58 97.8 °F (36.6 °C) 65  99 %    07/02/19 2343 104/67 97.5 °F (36.4 °C) 74 18 100 % 81.1 kg (178 lb 11.2 oz)     Recent Labs     07/03/19  0346   HGB 12.9*   HCT 37.9       Pt. resting in bed. Operative dressing clean/dry/intact. Sensation intact in bilateral lower extremities though patient does state his feet feel \"numb\". Calves soft, nontender. Assessment:     Awake and alert. In good spirits. Plan:   1. Continue pain management/ ice to operative area. 2. Continue to progress with PT/OT. 3. Discharge planning to home with home health.

## 2019-07-03 NOTE — ROUTINE PROCESS
Bedside and Verbal shift change report given to QAMAR Keys RN by Luz Ghosh. Report included the following information SBAR, Kardex, OR Summary, Intake/Output and MAR.

## 2019-07-03 NOTE — PROGRESS NOTES
0800: Assumed care, Report received from GRACE Tran RN. Pt having breakfast at this time. No needs will continue to monitor and provide care.

## 2019-07-03 NOTE — PROGRESS NOTES
Problem: Falls - Risk of  Goal: *Absence of Falls  Description  Document Jade Mederos Fall Risk and appropriate interventions in the flowsheet. Outcome: Progressing Towards Goal     Problem: Patient Education: Go to Patient Education Activity  Goal: Patient/Family Education  Outcome: Progressing Towards Goal     Problem: Patient Education: Go to Patient Education Activity  Goal: Patient/Family Education  Outcome: Progressing Towards Goal     Problem: Surgical Pathway Day of Surgery  Goal: Off Pathway (Use only if patient is Off Pathway)  Outcome: Progressing Towards Goal  Goal: Activity/Safety  Outcome: Progressing Towards Goal  Goal: Consults, if ordered  Outcome: Progressing Towards Goal  Goal: Nutrition/Diet  Outcome: Progressing Towards Goal  Goal: Medications  Outcome: Progressing Towards Goal  Goal: Respiratory  Outcome: Progressing Towards Goal  Goal: Treatments/Interventions/Procedures  Outcome: Progressing Towards Goal  Goal: Psychosocial  Outcome: Progressing Towards Goal  Goal: *No signs and symptoms of infection or wound complications  Outcome: Progressing Towards Goal  Goal: *Optimal pain control at patient's stated goal  Outcome: Progressing Towards Goal  Goal: *Adequate urinary output (equal to or greater than 30 milliliters/hour)  Description  Ambulatory Surgery patients voiding without difficulty.   Outcome: Progressing Towards Goal  Goal: *Hemodynamically stable  Outcome: Progressing Towards Goal  Goal: *Tolerating diet  Outcome: Progressing Towards Goal  Goal: *Demonstrates progressive activity  Outcome: Progressing Towards Goal     Problem: Pain  Goal: *Control of Pain  Outcome: Progressing Towards Goal     Problem: Patient Education: Go to Patient Education Activity  Goal: Patient/Family Education  Outcome: Progressing Towards Goal

## (undated) DEVICE — DRAPE XR C ARM 41X74IN LF --

## (undated) DEVICE — ADHESIVE SKIN CLOSURE 4X22 CM PREMIERPRO EXOFINFUSION DISP

## (undated) DEVICE — STERILE POLYISOPRENE POWDER-FREE SURGICAL GLOVES: Brand: PROTEXIS

## (undated) DEVICE — Z DISCONTINUED USE 2429233 DRESSING FOAM W10XL10CM 5 LAYR SELF ADH VERSATILE SAFETAC

## (undated) DEVICE — SHEET,DRAPE,70X85,STERILE: Brand: MEDLINE

## (undated) DEVICE — SUTURE ABSORBABLE BRAIDED 1-0 OS-8 CR 3X18 IN UD VICRYL J757T

## (undated) DEVICE — DRAPE,UTILITY,XL,4/PK,STERILE: Brand: MEDLINE

## (undated) DEVICE — MEDI-VAC NON-CONDUCTIVE SUCTION TUBING: Brand: CARDINAL HEALTH

## (undated) DEVICE — SUT VCRL + 2-0 27IN CT2 UD -- 36/BX

## (undated) DEVICE — SUT MONOCRYL PLUS UD 4-0 --

## (undated) DEVICE — 4-PORT MANIFOLD: Brand: NEPTUNE 2

## (undated) DEVICE — GAUZE,SPONGE,8"X4",12PLY,XRAY,STRL,LF: Brand: MEDLINE

## (undated) DEVICE — 3.0MM PRECISION NEURO (MATCH HEAD)

## (undated) DEVICE — Z DISCONTINUED USE (MFG CAT 7984-37) SOLUTION IV SODIUM CHL 0.9% 100 ML INJ

## (undated) DEVICE — SUTURE ABSRB BRAID COAT UD OS-6 NO 1 27IN VCRL J535H

## (undated) DEVICE — BIPOLAR SEALER 23-312-1 AQM SBS 5.0: Brand: AQUAMANTYS™

## (undated) DEVICE — KENDALL SCD EXPRESS SLEEVES, KNEE LENGTH, MEDIUM: Brand: KENDALL SCD

## (undated) DEVICE — REM POLYHESIVE ADULT PATIENT RETURN ELECTRODE: Brand: VALLEYLAB

## (undated) DEVICE — Device

## (undated) DEVICE — PACK PROCEDURE SURG LAMINECTOMY SPINE CUST

## (undated) DEVICE — CATH IV AUTOGRD ORN 14GA 45MM -- INSYTE-N

## (undated) DEVICE — SUT ETHLN 3-0 18IN PS2 BLK --

## (undated) DEVICE — SHEET,DRAPE,40X58,STERILE: Brand: MEDLINE

## (undated) DEVICE — DRESSING FOAM SELF ADH 20X10 CM ABSORBENT MEPILEX BORDER

## (undated) DEVICE — DISPOSABLE PEDICLE SCREW PROBE

## (undated) DEVICE — 24 ELECTRODES, 13MM LONG, 0.4MM DIAMETER DISPOSABLE SINGLE SUBDERMAL NEEDLE WITH TOUCH PROOF CONNECTOR: Brand: RHYTHMLINK®

## (undated) DEVICE — DISPOSABLE HARDY BAYONET INSULATED BIPOLAR FORCEPS: Brand: INTEGRA® JARIT®

## (undated) DEVICE — SOL IRRIGATION INJ NACL 0.9% 500ML BTL

## (undated) DEVICE — SOLUTION SCRB 4OZ 10% PVP I POVIDONE IOD TOP PAINT EXIDINE

## (undated) DEVICE — PREP SKN PREVAIL 40ML APPL --

## (undated) DEVICE — SUTURE MCRYL SZ 3-0 L27IN ABSRB UD L26MM SH 1/2 CIR Y416H